# Patient Record
Sex: FEMALE | Race: WHITE | Employment: OTHER | URBAN - METROPOLITAN AREA
[De-identification: names, ages, dates, MRNs, and addresses within clinical notes are randomized per-mention and may not be internally consistent; named-entity substitution may affect disease eponyms.]

---

## 2019-08-14 ENCOUNTER — HOSPITAL ENCOUNTER (EMERGENCY)
Facility: CLINIC | Age: 74
Discharge: HOME | End: 2019-08-14
Attending: EMERGENCY MEDICINE | Admitting: EMERGENCY MEDICINE
Payer: MEDICARE

## 2019-08-14 VITALS
TEMPERATURE: 98.3 F | SYSTOLIC BLOOD PRESSURE: 152 MMHG | OXYGEN SATURATION: 99 % | DIASTOLIC BLOOD PRESSURE: 76 MMHG | WEIGHT: 163 LBS | RESPIRATION RATE: 18 BRPM | HEART RATE: 69 BPM
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
## 2019-08-14 PROCEDURE — 12011 RPR F/E/E/N/L/M 2.5 CM/<: CPT

## 2019-08-14 PROCEDURE — 25000128 H RX IP 250 OP 636: Performed by: EMERGENCY MEDICINE

## 2019-08-14 PROCEDURE — 40650 RPR LIP FTH VERMILION ONLY: CPT

## 2019-08-14 PROCEDURE — 90715 TDAP VACCINE 7 YRS/> IM: CPT | Performed by: EMERGENCY MEDICINE

## 2019-08-14 PROCEDURE — 90471 IMMUNIZATION ADMIN: CPT

## 2019-08-14 PROCEDURE — 64400 NJX AA&/STRD TRIGEMINAL NRV: CPT

## 2019-08-14 PROCEDURE — 99283 EMERGENCY DEPT VISIT LOW MDM: CPT | Mod: 25

## 2019-08-14 RX ORDER — LIDOCAINE HYDROCHLORIDE 10 MG/ML
INJECTION, SOLUTION INFILTRATION; PERINEURAL
Status: DISCONTINUED
Start: 2019-08-14 | End: 2019-08-15 | Stop reason: HOSPADM

## 2019-08-14 RX ORDER — ACETAMINOPHEN 325 MG/1
650 TABLET ORAL EVERY 6 HOURS PRN
Qty: 20 TABLET | Refills: 0 | Status: SHIPPED | OUTPATIENT
Start: 2019-08-14

## 2019-08-14 RX ADMIN — CLOSTRIDIUM TETANI TOXOID ANTIGEN (FORMALDEHYDE INACTIVATED), CORYNEBACTERIUM DIPHTHERIAE TOXOID ANTIGEN (FORMALDEHYDE INACTIVATED), BORDETELLA PERTUSSIS TOXOID ANTIGEN (GLUTARALDEHYDE INACTIVATED), BORDETELLA PERTUSSIS FILAMENTOUS HEMAGGLUTININ ANTIGEN (FORMALDEHYDE INACTIVATED), BORDETELLA PERTUSSIS PERTACTIN ANTIGEN, AND BORDETELLA PERTUSSIS FIMBRIAE 2/3 ANTIGEN 0.5 ML: 5; 2; 2.5; 5; 3; 5 INJECTION, SUSPENSION INTRAMUSCULAR at 23:48

## 2019-08-14 ASSESSMENT — ENCOUNTER SYMPTOMS: WOUND: 1

## 2019-08-14 NOTE — ED AVS SNAPSHOT
[facility] Emergency Department  [address]  [facility] [address]  Phone:  [phone]  Fax:  [phone]                                    Tasia Cedeno   MRN: [id number]    Department:  [facility] Emergency Department   Date of Visit:  8/14/2019           After Visit Summary Signature Page    I have received my discharge instructions, and my questions have been answered. I have discussed any challenges I see with this plan with the nurse or doctor.    ..........................................................................................................................................  Patient/Patient Representative Signature      ..........................................................................................................................................  Patient Representative Print Name and Relationship to Patient    ..................................................               ................................................  Date                                   Time    ..........................................................................................................................................  Reviewed by Signature/Title    ...................................................              ..............................................  Date                                               Time          22EPIC Rev 08/18

## 2019-08-15 NOTE — DISCHARGE INSTRUCTIONS
I reviewed with the patient signs and symptoms of wound infection: worsening redness, swelling, pain, fever, streaking of the skin, and if this is a concern, to return for provider evaluation. Patient expressed understanding of instructions.   Please use blistex to lips

## 2019-08-15 NOTE — ED PROVIDER NOTES
"  History     Chief Complaint:  Dog Bite      HPI   Tasia Cedeno is a 74 year old female from [place], who presents to the ED with her  with multiple lacerations to her mouth after getting bit by her friend's fully vaccinated corina tilley this evening. The patient describes that she was \"nose to nose\" with the dog before the incident. The patient is not up to date on her tetanus vaccination.     Allergies:  NKDA     Medications:    No current outpatient medications on file.     Past Medical History:    No past medical history on file.    Past Surgical History:    No past surgical history on file.    Family History:    No family history on file.    Social History:  PCP: Physician No Ref-Primary  Presents to the ED with her .  Not up to date on immunization.      Review of Systems   Skin: Positive for wound.   All other systems reviewed and are negative.      Physical Exam     Patient Vitals for the past 24 hrs:   BP Temp Temp src Pulse Heart Rate Resp SpO2 Weight   08/14/19 2233 (!) 152/76 -- -- -- -- -- -- --   08/14/19 2232 -- 98.3  F (36.8  C) Oral 69 69 18 99 % 73.9 kg (163 lb)       Physical Exam  Nursing note and vitals reviewed.  Constitutional: Well nourished.  Eyes: Conjunctiva normal.  Pupils are equal, round, and reactive to light.   ENT: Nose normal. Mucous membranes pink and moist.    Upper R. Lip with full thickness 3.2 cm stellate laceration, 1cm avulsion just medial to laceration in center of upper lip  Lower R. Lip with full thickness 2.2 cm laceration  No loose teeth   Neck: Normal range of motion.  CVS: Normal rate, regular rhythm.  Normal heart sounds.  No murmur.  Pulmonary: Lungs clear to auscultation bilaterally. No wheezes/rales/rhonchi.  GI: Abdomen soft. Nontender, nondistended. No rigidity or guarding.    MSK: No calf tenderness or swelling.  Neuro: Alert. Follows simple commands.  Skin: Skin is warm and dry. No rash noted.   Psychiatric: Normal affect.       Emergency " Department Course     Procedures:    PROCEDURE:  Inferior alveolar nerve block.  INDICATION:  Mouth lacerations  PHYSICIAN:  Jen Bang DO  DESCRIPTION OF PROCEDURE: Informed verbal consent.  Site correctly identified.  Using dental syringe and 1% lidocaine, the inferior alveolar nerve was anesthetized using standard technique.  Patient tolerated procedure well, no complications.     PROCEDURE:  Superior alveolar nerve block.  INDICATION:  Mouth lacerations  PHYSICIAN:  Jen Bang DO  DESCRIPTION OF PROCEDURE: Informed verbal consent.  Site correctly identified.  Using dental syringe and 1% lidocaine, the superior alveolar nerve was anesthetized using standard technique.  Patient tolerated procedure well, no complications.         Laceration Repair       LACERATION:  A stellate full thickness clean 3.2 cm laceration with noted tissue avulsion just lateral to laceration.      LOCATION:  Right upper lip involving Vermillion boarder       FUNCTION:  Distally sensation, circulation, motor and tendon function are intact.      ANESTHESIA:  Superior and inferior alveolar blocks as noted above and local anesthetic with 1% lidocaine 0.5 mls      PREPARATION:  Irrigation and Scrubbing with Normal Saline and Shur Clens      DEBRIDEMENT:  no debridement      CLOSURE:  Wound was closed with two layers. Subcutaneous layer closed with 1 x 6.0 deep fast absorbing gut suture. Skin closed with 6.0 nylon using interrupted sutures.      Laceration Repair        LACERATION:  A stellate full thickness clean 2.2 cm laceration.      LOCATION:  Right lower lip      FUNCTION:  Distally sensation, circulation, motor and tendon function are intact.      ANESTHESIA:  As noted above.       PREPARATION:  Irrigation and Scrubbing with Normal Saline and Shur Clens      DEBRIDEMENT:  no debridement      CLOSURE:  Wound was closed with One Layer.  Skin closed with 5 x 6.0 nylon using interrupted sutures.    Interventions:  2348: Tdap  0.5 ml IM    Emergency Department Course:  2238: Nursing notes and vitals reviewed. I performed an exam of the patient as documented above.     Medicine administered as documented above.     I sutured the patient's lacerations, see procedure notes above.     2345: I rechecked the patient and discussed the results of her workup thus far.     Findings and plan explained to the Patient. Patient discharged home with instructions regarding supportive care, medications, and reasons to return. The importance of close follow-up was reviewed. The patient was prescribed Tylenol and Augmentin.     I personally answered all related questions prior to discharge.     Impression & Plan      Medical Decision Making:  Tasia Cedeno is a 74 year old female who presents with multiple complicated lip lacerations secondary to dog bite. Her tetanus was updated. She had noted avulsion injury and significant disruption of the [place]illion border in multiple locations. These were repaired to the best of my ability. We discussed long term scarring and overall cosmesis.  Planning close outpatient followup within 1 week for suture removal. Return to ED for fever, worsening redness, pus to the area, or if symptoms worsen or change. Recommended Tylenol and Motrin for analgesia and blistex for her lips. Given dog bite, will cover with Augmentin.     Diagnosis:    ICD-10-CM    1. Dog bite, initial encounter W54.0XXA        Disposition:  discharged to home    Discharge Medications:  New Prescriptions    ACETAMINOPHEN (TYLENOL) 325 MG TABLET    Take 2 tablets (650 mg) by mouth every 6 hours as needed for mild pain    AMOXICILLIN-CLAVULANATE (AUGMENTIN) 875-125 MG TABLET    Take 1 tablet by mouth 2 times daily for 3 days     IIrasema, am serving as a scribe at 10:38 PM on 8/14/2019 to document services personally performed by Jen Bang DO based on my observations and the provider's statements to me.       Irasema Worthy  8/14/2019   [place]  [facility] EMERGENCY DEPARTMENT       Jen Bang,   08/15/19 0024

## 2019-08-15 NOTE — ED NOTES
Assisted MD with sutures and irrigation. Lips now well approximated. Pt tolerated procedure extremely well.